# Patient Record
Sex: MALE | Race: WHITE | NOT HISPANIC OR LATINO | Employment: STUDENT | URBAN - METROPOLITAN AREA
[De-identification: names, ages, dates, MRNs, and addresses within clinical notes are randomized per-mention and may not be internally consistent; named-entity substitution may affect disease eponyms.]

---
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
## 2022-01-26 ENCOUNTER — TELEPHONE (OUTPATIENT)
Dept: FAMILY MEDICINE CLINIC | Facility: CLINIC | Age: 22
End: 2022-01-26

## 2022-01-26 NOTE — TELEPHONE ENCOUNTER
Going to a concert 1-0-71, needs covid test done 72 hours prior  Can you put an order in for him to get swabbed? Can he get swabbed at our office?

## 2022-01-26 NOTE — TELEPHONE ENCOUNTER
Ask Tray Kendrick, but I believe we can test him here    I will not put in the order until it is closer to the time when he needs it

## 2022-02-03 ENCOUNTER — TELEPHONE (OUTPATIENT)
Dept: FAMILY MEDICINE CLINIC | Facility: CLINIC | Age: 22
End: 2022-02-03

## 2022-02-03 DIAGNOSIS — Z03.818 ENCOUNTER FOR OBSERVATION FOR SUSPECTED EXPOSURE TO OTHER BIOLOGICAL AGENTS RULED OUT: Primary | ICD-10-CM

## 2022-02-04 DIAGNOSIS — Z11.9 ENCOUNTER FOR SCREENING FOR INFECTIOUS AND PARASITIC DISEASES, UNSPECIFIED: Primary | ICD-10-CM

## 2022-02-07 ENCOUNTER — TELEPHONE (OUTPATIENT)
Dept: FAMILY MEDICINE CLINIC | Facility: CLINIC | Age: 22
End: 2022-02-07

## 2022-02-07 DIAGNOSIS — Z11.9 ENCOUNTER FOR SCREENING FOR INFECTIOUS AND PARASITIC DISEASES, UNSPECIFIED: Primary | ICD-10-CM

## 2023-06-30 ENCOUNTER — HOSPITAL ENCOUNTER (EMERGENCY)
Facility: HOSPITAL | Age: 23
Discharge: HOME | End: 2023-06-30
Attending: EMERGENCY MEDICINE
Payer: COMMERCIAL

## 2023-06-30 VITALS
SYSTOLIC BLOOD PRESSURE: 151 MMHG | TEMPERATURE: 97.2 F | WEIGHT: 156.97 LBS | HEART RATE: 62 BPM | DIASTOLIC BLOOD PRESSURE: 68 MMHG | RESPIRATION RATE: 16 BRPM | OXYGEN SATURATION: 95 %

## 2023-06-30 DIAGNOSIS — M54.9 MUSCULOSKELETAL BACK PAIN: Primary | ICD-10-CM

## 2023-06-30 LAB
BILIRUB UR QL STRIP: NEGATIVE
CLARITY UR: CLEAR
COLOR UR: COLORLESS
GLUCOSE UR STRIP-MCNC: NEGATIVE MG/DL
HGB UR QL STRIP.AUTO: NEGATIVE
KETONES UR STRIP-MCNC: NEGATIVE MG/DL
LEUKOCYTE ESTERASE UR QL STRIP: NEGATIVE
NITRITE UR QL STRIP: NEGATIVE
PH UR STRIP.AUTO: 6.5 [PH]
PROT UR STRIP-MCNC: NEGATIVE MG/DL
SP GR UR STRIP.AUTO: 1.01 (ref 1–1.03)
UROBILINOGEN UR QL STRIP.AUTO: 0.2 E.U./DL

## 2023-06-30 PROCEDURE — 99283 EMERGENCY DEPT VISIT LOW MDM: CPT

## 2023-06-30 PROCEDURE — 81003 URINALYSIS AUTO W/O SCOPE: CPT | Performed by: PHYSICIAN ASSISTANT

## 2023-06-30 RX ORDER — NAPROXEN 500 MG/1
500 TABLET ORAL 2 TIMES DAILY WITH MEALS
Qty: 30 TABLET | Refills: 0 | Status: SHIPPED | OUTPATIENT
Start: 2023-06-30

## 2023-06-30 NOTE — ED PROVIDER NOTES
History  Chief Complaint   Patient presents with   • Back Pain     Patient complaining of lower back pain starting around 0     29-year-old male here with family for evaluation of nontraumatic left-sided flank pain. The pain commenced approximately 3 hours ago. There is no history of blunt or penetrating trauma no falls. Pain is constant. No history of intermittent episodes there is no radiation of the symptoms into the abdomen or mid back. No associated nausea or vomiting. Denies urinary urgency frequency burning no hematuria. Family history positive for renal calculi but no personal history of such. Pain is worse when he tries to sit up. Nontoxic-appearing at bedside. Prior to Admission Medications   Prescriptions Last Dose Informant Patient Reported? Taking?   brompheniramine-pseudoephedrine-DM 30-2-10 MG/5ML syrup   No No   Sig: Take 10 mL by mouth 4 (four) times a day as needed for congestion or cough      Facility-Administered Medications: None       Past Medical History:   Diagnosis Date   • Closed fracture of ankle    • Finger injury     LAST ASSESSED: 2/21/14   • Fracture dislocation of ankle     2013       Past Surgical History:   Procedure Laterality Date   • ADENOIDECTOMY     • TONSILLECTOMY     • TONSILLECTOMY AND ADENOIDECTOMY         Family History   Problem Relation Age of Onset   • Heart disease Father    • Stroke Father         SYNDROME     I have reviewed and agree with the history as documented.     E-Cigarette/Vaping   • E-Cigarette Use Never User      E-Cigarette/Vaping Substances   • Nicotine No    • THC No    • CBD No    • Flavoring No      Social History     Tobacco Use   • Smoking status: Passive Smoke Exposure - Never Smoker   • Smokeless tobacco: Never   • Tobacco comments:     NEVER A SMOKER AS PER [employer]   Vaping Use   • Vaping Use: Never used   Substance Use Topics   • Alcohol use: Not Currently   • Drug use: Never       Review of Systems   Constitutional: Negative. Negative for activity change, appetite change, chills, diaphoresis, fatigue, fever and unexpected weight change. HENT: Negative. Negative for sore throat, trouble swallowing and voice change. Eyes: Negative. Respiratory: Negative. Negative for cough, chest tightness, shortness of breath and wheezing. Cardiovascular: Negative. Negative for chest pain, palpitations and leg swelling. Gastrointestinal: Negative. Negative for abdominal pain, blood in stool, nausea and vomiting. Endocrine: Negative. Genitourinary: Negative. Negative for flank pain and hematuria. Musculoskeletal: Positive for back pain. Negative for arthralgias, gait problem, joint swelling, myalgias, neck pain and neck stiffness. Skin: Negative. Negative for rash and wound. Allergic/Immunologic: Negative. Neurological: Negative. Negative for dizziness, seizures, syncope, weakness, light-headedness and headaches. Hematological: Negative. Psychiatric/Behavioral: Negative. All other systems reviewed and are negative. Physical Exam  Physical Exam  Vitals reviewed. Constitutional:       General: He is not in acute distress. Appearance: He is well-developed. He is not ill-appearing or diaphoretic. HENT:      Right Ear: External ear normal. No swelling. Tympanic membrane is not bulging. Left Ear: External ear normal. No swelling. Tympanic membrane is not bulging. Nose: Nose normal.      Mouth/Throat:      Pharynx: No oropharyngeal exudate. Eyes:      General: Lids are normal.      Conjunctiva/sclera: Conjunctivae normal.      Pupils: Pupils are equal, round, and reactive to light. Neck:      Thyroid: No thyromegaly. Vascular: No JVD. Trachea: No tracheal deviation. Cardiovascular:      Rate and Rhythm: Normal rate and regular rhythm. Pulses: Normal pulses. Heart sounds: Normal heart sounds. No murmur heard. No friction rub. No gallop.    Pulmonary: Effort: Pulmonary effort is normal. No respiratory distress. Breath sounds: Normal breath sounds. No stridor. No wheezing or rales. Chest:      Chest wall: No tenderness. Abdominal:      General: Bowel sounds are normal. There is no distension. Palpations: Abdomen is soft. There is no mass. Tenderness: There is no abdominal tenderness. There is no guarding or rebound. Negative signs include Cole's sign. Hernia: No hernia is present. Musculoskeletal:         General: No tenderness. Normal range of motion. Cervical back: Normal range of motion and neck supple. No edema. Normal range of motion. Comments: I cannot reproduce the tenderness in the region. There is no central spinal tenderness no skin changes there is no CVA tenderness. Lymphadenopathy:      Cervical: No cervical adenopathy. Skin:     General: Skin is warm and dry. Capillary Refill: Capillary refill takes less than 2 seconds. Coloration: Skin is not pale. Findings: No erythema or rash. Neurological:      Mental Status: He is alert and oriented to person, place, and time. GCS: GCS eye subscore is 4. GCS verbal subscore is 5. GCS motor subscore is 6. Cranial Nerves: No cranial nerve deficit. Sensory: No sensory deficit. Deep Tendon Reflexes: Reflexes are normal and symmetric.    Psychiatric:         Speech: Speech normal.         Behavior: Behavior normal.         Vital Signs  ED Triage Vitals   Temperature Pulse Respirations Blood Pressure SpO2   06/30/23 1914 06/30/23 1914 06/30/23 1914 06/30/23 1914 06/30/23 1914   (!) 97.2 °F (36.2 °C) 62 16 151/68 95 %      Temp Source Heart Rate Source Patient Position - Orthostatic VS BP Location FiO2 (%)   06/30/23 1914 06/30/23 1914 06/30/23 1914 06/30/23 1914 --   Tympanic Monitor Sitting Right arm       Pain Score       06/30/23 1913       7           Vitals:    06/30/23 1914   BP: 151/68   Pulse: 62   Patient Position - Orthostatic VS: Sitting         Visual Acuity      ED Medications  Medications - No data to display    Diagnostic Studies  Results Reviewed     Procedure Component Value Units Date/Time    UA (URINE) with reflex to Scope [[id number]] Collected: 06/30/23 1942    Lab Status: Final result Specimen: Urine, Clean Catch Updated: 06/30/23 2005     Color, UA Colorless     Clarity, UA Clear     Specific Gravity, UA 1.015     pH, UA 6.5     Leukocytes, UA Negative     Nitrite, UA Negative     Protein, UA Negative mg/dl      Glucose, UA Negative mg/dl      Ketones, UA Negative mg/dl      Urobilinogen, UA 0.2 E.U./dl      Bilirubin, UA Negative     Occult Blood, UA Negative                 No orders to display              Procedures  Procedures         ED Course  ED Course as of 06/30/23 2037 Fri Jun 30, 2023 1943 SpO2: 95 %   1943 Respirations: 16   1943 Pulse: 62   1943 Temperature(!): 97.2 °F (36.2 °C)   1943 Blood Pressure: 151/68  Vital signs reviewed within normal limits. 2019 Blood, UA: Negative   2019 SL AMB SPECIFIC GRAVITY_URINE: 1.015   2019 Clarity, UA: Clear   2019 Color, UA: Colorless         CRAFFT    Flowsheet Row Most Recent Value   CRAFFT Initial Screen: During the past 12 months, did you:    1. Drink any alcohol (more than a few sips)? No Filed at: 06/30/2023 1916   2. Smoke any marijuana or hashish No Filed at: 06/30/2023 1916   3. Use anything else to get high? ("anything else" includes illegal drugs, over the counter and prescription drugs, and things that you sniff or 'tim')? No Filed at: 06/30/2023 1916                                          Medical Decision Making  79-year-old male otherwise healthy here for evaluation of nontraumatic left-sided back pain. Family history of ureteral calculi but no personal history of such. Pain is nonradiating began a few hours ago. Will evaluate with a urine sample to look for presence of nitrates leukocytes or blood.   Differential diagnosis includes pyelonephritis ureteral calculi musculoskeletal component. Urine is without signs of nitrates leukocytes or blood doubt  Renal calculi pain worse with movement consistent with musculoskeletal we will treat with anti-inflammatories and heat. Avoid ice. Amount and/or Complexity of Data Reviewed  Labs: ordered. Decision-making details documented in ED Course. Risk  Prescription drug management. Disposition  Final diagnoses:   Musculoskeletal back pain     Time reflects when diagnosis was documented in both MDM as applicable and the Disposition within this note     Time User Action Codes Description Comment    6/30/2023  8:35 PM Salvador Arellano Susana [M54.9] Musculoskeletal back pain       ED Disposition     ED Disposition   Discharge    Condition   Stable    Date/Time   Fri Jun 30, 2023  8:35 PM    Comment   [place] discharge to home/self care. Follow-up Information     Follow up With Specialties Details Why Contact Ann Siu DO Family Medicine Schedule an appointment as soon as possible for a visit  As needed [facility] [address]  [phone]            Patient's Medications   Discharge Prescriptions    NAPROXEN (NAPROSYN) 500 MG TABLET    Take 1 tablet (500 mg total) by mouth 2 (two) times a day with meals       Start Date: 6/30/2023 End Date: --       Order Dose: 500 mg       Quantity: 30 tablet    Refills: 0       No discharge procedures on file.     PDMP Review     None          ED Provider  Electronically Signed by           Kirt Snell PA-C  06/30/23 2037

## 2023-06-30 NOTE — Clinical Note
Sean Bourgeois was seen and treated in our emergency department on 6/30/2023. Diagnosis: Lumbar strain    Ofelia Sanchez  . He may return on this date: 07/03/2023    Patient is excuse from work on July 1 and July 2. If you have any questions or concerns, please don't hesitate to call.       Marcia Stewart PA-C    ______________________________           _______________          _______________  Hospital Representative                              Date                                Time

## 2023-07-06 ENCOUNTER — OFFICE VISIT (OUTPATIENT)
Dept: FAMILY MEDICINE CLINIC | Facility: CLINIC | Age: 23
End: 2023-07-06
Payer: COMMERCIAL

## 2023-07-06 VITALS
DIASTOLIC BLOOD PRESSURE: 82 MMHG | HEIGHT: 68 IN | SYSTOLIC BLOOD PRESSURE: 126 MMHG | WEIGHT: 158.8 LBS | BODY MASS INDEX: 24.07 KG/M2 | HEART RATE: 77 BPM | TEMPERATURE: 97.2 F | OXYGEN SATURATION: 98 %

## 2023-07-06 DIAGNOSIS — S39.012D STRAIN OF LUMBAR REGION, SUBSEQUENT ENCOUNTER: Primary | ICD-10-CM

## 2023-07-06 DIAGNOSIS — Z84.1 FAMILY HISTORY OF KIDNEY STONES: ICD-10-CM

## 2023-07-06 PROCEDURE — 3725F SCREEN DEPRESSION PERFORMED: CPT | Performed by: FAMILY MEDICINE

## 2023-07-06 PROCEDURE — 99213 OFFICE O/P EST LOW 20 MIN: CPT | Performed by: FAMILY MEDICINE

## 2023-07-06 NOTE — PROGRESS NOTES
Name: Sharee Aguilera      : 2000      MRN: [id number]  Encounter Provider: Martina Menendez DO  Encounter Date: 2023   Encounter department: [address]   Since patient has a strong family history of kidney stones, and the pain did come on suddenly, I will check an ultrasound of his kidneys. He will call us if the pain starts again. Follow-up as needed  1. Strain of lumbar region, subsequent encounter  -     US kidney and bladder; Future; Expected date: 2023    2. Family history of kidney stones  -     US kidney and bladder; Future; Expected date: 2023           Subjective     Patient here today and was in the ER on Friday for sudden onset of left lower back pain. Urinalysis in the ER was negative. Given naproxen, which did help. Patient states the pain is almost fully gone. There is a strong family history of kidney stones, on his mother side. His mom gets them quite frequently. Back Pain      Review of Systems   Constitutional: Negative. Respiratory: Negative. Cardiovascular: Negative. Gastrointestinal: Negative. Genitourinary: Negative. Musculoskeletal: Positive for back pain.        Past Medical History:   Diagnosis Date   • Closed fracture of ankle    • Finger injury     LAST ASSESSED: 14   • Fracture dislocation of ankle          Past Surgical History:   Procedure Laterality Date   • ADENOIDECTOMY     • TONSILLECTOMY     • TONSILLECTOMY AND ADENOIDECTOMY       Family History   Problem Relation Age of Onset   • Heart disease Father    • Stroke Father         SYNDROME     Social History     Socioeconomic History   • Marital status: Single     Spouse name: None   • Number of children: None   • Years of education: None   • Highest education level: None   Occupational History   • None   Tobacco Use   • Smoking status: Passive Smoke Exposure - Never Smoker   • Smokeless tobacco: Never   • Tobacco comments:     NEVER A SMOKER AS PER [employer]   Vaping Use   • Vaping Use: Never used   Substance and Sexual Activity   • Alcohol use: Not Currently   • Drug use: Never   • Sexual activity: None   Other Topics Concern   • None   Social History Narrative   • None     Social Determinants of Health     Financial Resource Strain: Not on file   Food Insecurity: Not on file   Transportation Needs: Not on file   Physical Activity: Not on file   Stress: Not on file   Social Connections: Not on file   Intimate Partner Violence: Not on file   Housing Stability: Not on file     Current Outpatient Medications on File Prior to Visit   Medication Sig   • naproxen (Naprosyn) 500 mg tablet Take 1 tablet (500 mg total) by mouth 2 (two) times a day with meals   • [DISCONTINUED] brompheniramine-pseudoephedrine-DM 30-2-10 MG/5ML syrup Take 10 mL by mouth 4 (four) times a day as needed for congestion or cough (Patient not taking: Reported on 7/6/2023)     No Known Allergies  Immunization History   Administered Date(s) Administered   • DTaP / Hep B / IPV 01/24/2003, 03/24/2003, 05/28/2003   • DTaP 5 06/16/2004, 10/30/2007   • Hep A, ped/adol, 2 dose 04/04/2019   • Hep B, adult 2000   • Hepatitis A 04/04/2019   • Hib (PRP-OMP) 01/24/2003, 03/24/2003, 05/28/2003, 06/16/2004   • IPV 10/30/2007   • Influenza, seasonal, injectable 11/16/2006   • MMR 12/03/2003, 10/30/2007   • Meningococcal B, Recombinant (TRUMENBA) 04/04/2019, 07/23/2020   • Meningococcal MCV4P 04/04/2019   • Meningococcal, Unknown Serogroups 08/06/2014   • Pneumococcal Conjugate PCV 7 03/24/2003, 05/28/2003, 06/16/2004   • Pneumococcal Polysaccharide PPV23 03/24/2003, 05/28/2003, 06/16/2004   • Tdap 08/06/2014   • Varicella 12/03/2003, 10/30/2007       Objective     /82   Pulse 77   Temp (!) 97.2 °F (36.2 °C)   Ht 5' 8" (1.727 m)   Wt 72 kg (158 lb 12.8 oz)   SpO2 98%   BMI 24.15 kg/m²     Physical Exam  Vitals reviewed.    Constitutional:       General: He is not in acute distress. Appearance: Normal appearance. He is well-developed. He is not ill-appearing, toxic-appearing or diaphoretic. HENT:      Head: Normocephalic and atraumatic. Eyes:      Conjunctiva/sclera: Conjunctivae normal.   Cardiovascular:      Rate and Rhythm: Normal rate and regular rhythm. Heart sounds: Normal heart sounds. No murmur heard. No friction rub. No gallop. Pulmonary:      Effort: Pulmonary effort is normal. No respiratory distress. Breath sounds: Normal breath sounds. No wheezing, rhonchi or rales. Musculoskeletal:         General: No tenderness (back). Right lower leg: No edema. Left lower leg: No edema. Neurological:      General: No focal deficit present. Mental Status: He is alert and oriented to person, place, and time. Psychiatric:         Mood and Affect: Mood normal.         Behavior: Behavior normal.         Thought Content:  Thought content normal.         Judgment: Judgment normal.       Khris Carballo, DO

## 2023-11-06 ENCOUNTER — OFFICE VISIT (OUTPATIENT)
Dept: URGENT CARE | Facility: MEDICAL CENTER | Age: 23
End: 2023-11-06
Payer: COMMERCIAL

## 2023-11-06 VITALS
SYSTOLIC BLOOD PRESSURE: 122 MMHG | WEIGHT: 166 LBS | DIASTOLIC BLOOD PRESSURE: 90 MMHG | BODY MASS INDEX: 25.16 KG/M2 | OXYGEN SATURATION: 99 % | TEMPERATURE: 98.6 F | RESPIRATION RATE: 18 BRPM | HEIGHT: 68 IN | HEART RATE: 77 BPM

## 2023-11-06 DIAGNOSIS — J20.9 ACUTE BRONCHITIS, UNSPECIFIED ORGANISM: Primary | ICD-10-CM

## 2023-11-06 PROCEDURE — 99213 OFFICE O/P EST LOW 20 MIN: CPT | Performed by: STUDENT IN AN ORGANIZED HEALTH CARE EDUCATION/TRAINING PROGRAM

## 2023-11-06 RX ORDER — AZITHROMYCIN 250 MG/1
TABLET, FILM COATED ORAL
Qty: 6 TABLET | Refills: 0 | Status: SHIPPED | OUTPATIENT
Start: 2023-11-06 | End: 2023-11-10

## 2023-11-06 NOTE — PROGRESS NOTES
[facility]        NAME: Lizeth Bustos is a 23 y.o. male  : 2000    MRN: [id number]    Assessment and Plan   Acute bronchitis, unspecified organism [J20.9]  1. Acute bronchitis, unspecified organism  azithromycin (ZITHROMAX) 250 mg tablet        Due to symptoms ongoing for almost a week without any improvement, will send for Z-Cristofer. No concern for pneumonia or other infectious etiology. Patient Instructions       Follow up with PCP in 3-5 days. Proceed to  ER if symptoms worsen. Chief Complaint     Chief Complaint   Patient presents with    Cold Like Symptoms     Mucus, sore throat, hurt to swallow, nasal congestion, stuffy & running nose. Nothing taken over the counter for symptoms. Symptoms started x 5 days. History of Present Illness       HPI    Presents with onset of symptoms 5 to 6 days ago. Reports productive cough, sore throat, congestion, sinus pain and pressure, rhinorrhea. Reports trouble swallowing and it feels like a pressure. Left ear pain which "popped" and went away. Denies fever, chills, nausea, vomiting, chest pain, dyspnea. Review of Systems   Review of Systems   Constitutional:  Negative for chills and fever. HENT:  Positive for ear pain, rhinorrhea, sinus pressure, sinus pain, sore throat and trouble swallowing. Negative for ear discharge. Eyes:  Negative for pain and visual disturbance. Respiratory:  Positive for cough. Negative for shortness of breath. Cardiovascular:  Negative for chest pain and palpitations. Gastrointestinal:  Negative for abdominal pain and vomiting. Genitourinary:  Negative for dysuria and hematuria. Musculoskeletal:  Negative for arthralgias and back pain. Skin:  Negative for color change and rash. Neurological:  Negative for seizures and syncope. All other systems reviewed and are negative.     Current Medications       Current Outpatient Medications:     azithromycin (ZITHROMAX) 250 mg tablet, Take 2 tablets today then 1 tablet daily x 4 days, Disp: 6 tablet, Rfl: 0    naproxen (Naprosyn) 500 mg tablet, Take 1 tablet (500 mg total) by mouth 2 (two) times a day with meals (Patient not taking: Reported on 11/6/2023), Disp: 30 tablet, Rfl: 0    Current Allergies     Allergies as of 11/06/2023    (No Known Allergies)            The following portions of the patient's history were reviewed and updated as appropriate: allergies, current medications, past family history, past medical history, past social history, past surgical history and problem list.     Past Medical History:   Diagnosis Date    Closed fracture of ankle     Finger injury     LAST ASSESSED: 2/21/14    Fracture dislocation of ankle     2013       Past Surgical History:   Procedure Laterality Date    ADENOIDECTOMY      TONSILLECTOMY      TONSILLECTOMY AND ADENOIDECTOMY         Family History   Problem Relation Age of Onset    Heart disease Father     Stroke Father         SYNDROME         Medications have been verified. Objective   /90   Pulse 77   Temp 98.6 °F (37 °C)   Resp 18   Ht 5' 8" (1.727 m)   Wt 75.3 kg (166 lb)   SpO2 99%   BMI 25.24 kg/m²        Physical Exam     Physical Exam  Constitutional:       Appearance: Normal appearance. HENT:      Head: Normocephalic and atraumatic. Right Ear: Tympanic membrane, ear canal and external ear normal. No middle ear effusion. There is no impacted cerumen. Tympanic membrane is not erythematous or bulging. Left Ear: Ear canal and external ear normal. A middle ear effusion is present. There is no impacted cerumen. Tympanic membrane is not erythematous or bulging. Nose: Congestion present. Mouth/Throat:      Mouth: Mucous membranes are moist.      Pharynx: Oropharynx is clear. Posterior oropharyngeal erythema (Minimal) present. No oropharyngeal exudate. Eyes:      General: No scleral icterus. Right eye: No discharge. Left eye: No discharge. Extraocular Movements: Extraocular movements intact. Conjunctiva/sclera: Conjunctivae normal.   Cardiovascular:      Rate and Rhythm: Normal rate and regular rhythm. Pulmonary:      Effort: Pulmonary effort is normal. No respiratory distress. Breath sounds: No stridor. No wheezing, rhonchi or rales. Musculoskeletal:      Cervical back: Normal range of motion. Lymphadenopathy:      Cervical: No cervical adenopathy. Neurological:      General: No focal deficit present. Mental Status: He is alert and oriented to person, place, and time.    Psychiatric:         Mood and Affect: Mood normal.         Behavior: Behavior normal.

## 2023-11-06 NOTE — LETTER
November 6, 2023     Patient: Joanie Muro   YOB: 2000   Date of Visit: 11/6/2023       To Whom it May Concern:    Nakul Hill was seen in my clinic on 11/6/2023. If you have any questions or concerns, please don't hesitate to call.          Sincerely,          Efe Escoto DO        CC: No Recipients

## 2024-01-12 ENCOUNTER — OFFICE VISIT (OUTPATIENT)
Dept: URGENT CARE | Facility: MEDICAL CENTER | Age: 24
End: 2024-01-12
Payer: COMMERCIAL

## 2024-01-12 VITALS
BODY MASS INDEX: 25.61 KG/M2 | OXYGEN SATURATION: 98 % | TEMPERATURE: 101.4 F | HEART RATE: 93 BPM | WEIGHT: 169 LBS | DIASTOLIC BLOOD PRESSURE: 88 MMHG | RESPIRATION RATE: 20 BRPM | SYSTOLIC BLOOD PRESSURE: 130 MMHG | HEIGHT: 68 IN

## 2024-01-12 DIAGNOSIS — R68.89 FLU-LIKE SYMPTOMS: ICD-10-CM

## 2024-01-12 DIAGNOSIS — R05.1 ACUTE COUGH: ICD-10-CM

## 2024-01-12 DIAGNOSIS — H66.92 LEFT OTITIS MEDIA, UNSPECIFIED OTITIS MEDIA TYPE: Primary | ICD-10-CM

## 2024-01-12 LAB
SARS-COV-2 AG UPPER RESP QL IA: NEGATIVE
VALID CONTROL: NORMAL

## 2024-01-12 PROCEDURE — 87811 SARS-COV-2 COVID19 W/OPTIC: CPT | Performed by: PHYSICIAN ASSISTANT

## 2024-01-12 PROCEDURE — 99212 OFFICE O/P EST SF 10 MIN: CPT | Performed by: PHYSICIAN ASSISTANT

## 2024-01-12 RX ORDER — AZITHROMYCIN 250 MG/1
TABLET, FILM COATED ORAL
Qty: 6 TABLET | Refills: 0 | Status: SHIPPED | OUTPATIENT
Start: 2024-01-12 | End: 2024-01-16

## 2024-01-12 NOTE — PROGRESS NOTES
[facility]        NAME: Gaurav Barlow is a 23 y.o. male  : 2000    MRN: [id number]  DATE: 2024  TIME: 4:59 PM    Assessment and Plan   Left otitis media, unspecified otitis media type [H66.92]  1. Left otitis media, unspecified otitis media type  azithromycin (ZITHROMAX) 250 mg tablet      2. Flu-like symptoms        3. Acute cough  Poct Covid 19 Rapid Antigen Test            Patient Instructions     Start antibiotic as prescribed  Take Tylenol or Ibuprofen as needed for fever or pain  Drink plenty of fluids  Follow up with PCP to ensure infection has resolved  Take Tylenol or Motrin as needed for fever or pain  May take over the counter cold medication to control symptoms  Drink plenty of fluids  Rest  You are contagious until fever resolves  If symptoms worsen go to the ER for further evaluation  Follow up with PCP in 3-5 days.  Proceed to  ER if symptoms worsen.    Chief Complaint     Chief Complaint   Patient presents with   • Cough     Cough, congestion. Runny nose. Blood tinged snots from nose. Throat pressure.         History of Present Illness       Patient presents with a 4-day history of runny, stuffy nose, sore throat, ear pain and cough.  Cough can be productive occasional blood.  Also blew his nose with purulent drainage and blood.  He has a fever of 101.4 degrees.  Point of care COVID testing negative.      Review of Systems   Review of Systems   Constitutional:  Positive for fever. Negative for chills.   HENT:  Positive for congestion, rhinorrhea and sore throat.    Respiratory:  Positive for cough.    Gastrointestinal:  Negative for diarrhea, nausea and vomiting.   Musculoskeletal:  Positive for myalgias.   Neurological:  Positive for headaches.         Current Medications       Current Outpatient Medications:   •  azithromycin (ZITHROMAX) 250 mg tablet, Take 2 tablets today then 1 tablet daily x 4 days, Disp: 6 tablet, Rfl: 0  •  naproxen (Naprosyn) 500 mg  "tablet, Take 1 tablet (500 mg total) by mouth 2 (two) times a day with meals (Patient not taking: Reported on 11/6/2023), Disp: 30 tablet, Rfl: 0    Current Allergies     Allergies as of 01/12/2024   • (No Known Allergies)            The following portions of the patient's history were reviewed and updated as appropriate: allergies, current medications, past family history, past medical history, past social history, past surgical history and problem list.     Past Medical History:   Diagnosis Date   • Closed fracture of ankle    • Finger injury     LAST ASSESSED: 2/21/14   • Fracture dislocation of ankle     2013       Past Surgical History:   Procedure Laterality Date   • ADENOIDECTOMY     • TONSILLECTOMY     • TONSILLECTOMY AND ADENOIDECTOMY         Family History   Problem Relation Age of Onset   • Heart disease Father    • Stroke Father         SYNDROME         Medications have been verified.        Objective   /88   Pulse 93   Temp (!) 101.4 °F (38.6 °C)   Resp 20   Ht 5' 8\" (1.727 m)   Wt 76.7 kg (169 lb)   SpO2 98%   BMI 25.70 kg/m²   No LMP for male patient.       Physical Exam     Physical Exam  Vitals and nursing note reviewed.   Constitutional:       Appearance: Normal appearance.   HENT:      Head: Normocephalic and atraumatic.      Right Ear: Tympanic membrane normal.      Ears:      Comments: Left TM with erythema fluid levels and bulging     Nose: Rhinorrhea present.      Comments: No bleeding     Mouth/Throat:      Mouth: Mucous membranes are moist.      Comments: Posterior pharynx erythema  Eyes:      Conjunctiva/sclera: Conjunctivae normal.   Cardiovascular:      Rate and Rhythm: Normal rate and regular rhythm.      Heart sounds: Normal heart sounds.   Pulmonary:      Effort: Pulmonary effort is normal.      Breath sounds: Normal breath sounds.   Musculoskeletal:      Cervical back: Neck supple.   Lymphadenopathy:      Cervical: No cervical adenopathy.   Skin:     General: Skin is " warm.   Neurological:      Mental Status: He is alert.

## 2024-01-12 NOTE — PATIENT INSTRUCTIONS
Start antibiotic as prescribed  Take Tylenol or Ibuprofen as needed for fever or pain  Drink plenty of fluids  Follow up with PCP to ensure infection has resolved  Take Tylenol or Motrin as needed for fever or pain  May take over the counter cold medication to control symptoms  Drink plenty of fluids  Rest  You are contagious until fever resolves  If symptoms worsen go to the ER for further evaluation

## 2024-10-25 ENCOUNTER — OFFICE VISIT (OUTPATIENT)
Dept: URGENT CARE | Facility: MEDICAL CENTER | Age: 24
End: 2024-10-25
Payer: COMMERCIAL

## 2024-10-25 VITALS
OXYGEN SATURATION: 97 % | TEMPERATURE: 97.8 F | SYSTOLIC BLOOD PRESSURE: 120 MMHG | DIASTOLIC BLOOD PRESSURE: 84 MMHG | WEIGHT: 169 LBS | HEART RATE: 71 BPM | RESPIRATION RATE: 20 BRPM | BODY MASS INDEX: 25.7 KG/M2

## 2024-10-25 DIAGNOSIS — B35.4 RINGWORM OF BODY: Primary | ICD-10-CM

## 2024-10-25 PROCEDURE — S9083 URGENT CARE CENTER GLOBAL: HCPCS | Performed by: PHYSICIAN ASSISTANT

## 2024-10-25 PROCEDURE — G0382 LEV 3 HOSP TYPE B ED VISIT: HCPCS | Performed by: PHYSICIAN ASSISTANT

## 2024-10-25 RX ORDER — CLOTRIMAZOLE 1 %
CREAM (GRAM) TOPICAL 2 TIMES DAILY
Qty: 113 G | Refills: 0 | Status: SHIPPED | OUTPATIENT
Start: 2024-10-25

## 2024-10-25 NOTE — PROGRESS NOTES
[facility]        NAME: Gaurav Barlow is a 23 y.o. male  : 2000    MRN: [id number]  DATE: 2024  TIME: 2:54 PM    Assessment and Plan   Ringworm of body [B35.4]  1. Ringworm of body  clotrimazole (LOTRIMIN) 1 % cream        Ringworm vs nummular eczema    Patient Instructions     Clotrimazole cream as prescribed. Apply to affected areas twice daily for 2-4 weeks. Apply for an additional week after rash resolves.   Carefully read additional information below to prevent further spread  Follow up with PCP in 3-5 days.  Proceed to  ER if symptoms worsen.    If tests have been performed at [facility], our office will contact you with results if changes need to be made to the care plan discussed with you at the visit.  [facility]    Chief Complaint     Chief Complaint   Patient presents with    Rash     Has rash to neck, face, arms, and back. Not itchy or painful. Started 2-3 weeks ago. Not applying anything. No other symptoms.         History of Present Illness       Rash  This is a new problem. The current episode started 1 to 4 weeks ago. The rash is diffuse. Rash characteristics: Denies pain or itching. It is unknown if there was an exposure to a precipitant. Pertinent negatives include no fever. Past treatments include nothing. There is no history of eczema (Reports family history of eczema).       Review of Systems   Review of Systems   Constitutional:  Negative for chills and fever.   HENT: Negative.     Skin:  Positive for rash.         Current Medications       Current Outpatient Medications:     clotrimazole (LOTRIMIN) 1 % cream, Apply topically 2 (two) times a day, Disp: 113 g, Rfl: 0    naproxen (Naprosyn) 500 mg tablet, Take 1 tablet (500 mg total) by mouth 2 (two) times a day with meals (Patient not taking: Reported on 2023), Disp: 30 tablet, Rfl: 0    Current Allergies     Allergies as of 10/25/2024    (No Known Allergies)             The following portions of the patient's history were reviewed and updated as appropriate: allergies, current medications, past family history, past medical history, past social history, past surgical history and problem list.     Past Medical History:   Diagnosis Date    Closed fracture of ankle     Finger injury     LAST ASSESSED: 2/21/14    Fracture dislocation of ankle     2013       Past Surgical History:   Procedure Laterality Date    ADENOIDECTOMY      TONSILLECTOMY      TONSILLECTOMY AND ADENOIDECTOMY         Family History   Problem Relation Age of Onset    Heart disease Father     Stroke Father         SYNDROME         Medications have been verified.        Objective   /84   Pulse 71   Temp 97.8 °F (36.6 °C)   Resp 20   Wt 76.7 kg (169 lb)   SpO2 97%   BMI 25.70 kg/m²   No LMP for male patient.       Physical Exam     Physical Exam  Constitutional:       General: He is not in acute distress.     Appearance: He is well-developed. He is not diaphoretic.   Cardiovascular:      Rate and Rhythm: Normal rate and regular rhythm.      Heart sounds: Normal heart sounds.   Pulmonary:      Effort: Pulmonary effort is normal. No respiratory distress.      Breath sounds: Normal breath sounds.   Skin:     General: Skin is warm.      Findings: Rash (Erythematous annular lesion with raised borders scattered throughout abdomen, back, neck and R side of face) present.   Neurological:      Mental Status: He is alert.

## 2024-10-25 NOTE — PATIENT INSTRUCTIONS
Clotrimazole cream as prescribed. Apply to affected areas twice daily for 2-4 weeks. Apply for an additional week after rash resolves.   Carefully read additional information below to prevent further spread  Follow up with PCP in 3-5 days.  Proceed to  ER if symptoms worsen.    If tests have been performed at [facility], our office will contact you with results if changes need to be made to the care plan discussed with you at the visit.  You can review your full results on [facility] MyChart.